# Patient Record
Sex: MALE | Race: AMERICAN INDIAN OR ALASKA NATIVE
[De-identification: names, ages, dates, MRNs, and addresses within clinical notes are randomized per-mention and may not be internally consistent; named-entity substitution may affect disease eponyms.]

---

## 2018-03-15 ENCOUNTER — HOSPITAL ENCOUNTER (OUTPATIENT)
Dept: HOSPITAL 5 - GIO | Age: 68
Discharge: HOME | End: 2018-03-15
Attending: INTERNAL MEDICINE
Payer: MEDICARE

## 2018-03-15 VITALS — SYSTOLIC BLOOD PRESSURE: 137 MMHG | DIASTOLIC BLOOD PRESSURE: 88 MMHG

## 2018-03-15 DIAGNOSIS — Z79.899: ICD-10-CM

## 2018-03-15 DIAGNOSIS — E11.9: ICD-10-CM

## 2018-03-15 DIAGNOSIS — R63.0: ICD-10-CM

## 2018-03-15 DIAGNOSIS — B96.81: ICD-10-CM

## 2018-03-15 DIAGNOSIS — J45.909: ICD-10-CM

## 2018-03-15 DIAGNOSIS — Z98.890: ICD-10-CM

## 2018-03-15 DIAGNOSIS — K44.9: Primary | ICD-10-CM

## 2018-03-15 PROCEDURE — 88342 IMHCHEM/IMCYTCHM 1ST ANTB: CPT

## 2018-03-15 PROCEDURE — 88305 TISSUE EXAM BY PATHOLOGIST: CPT

## 2018-03-15 PROCEDURE — 43239 EGD BIOPSY SINGLE/MULTIPLE: CPT

## 2018-03-15 NOTE — OPERATIVE REPORT
Operative Report


Operative Report: 


Date: 03/15/2018    


Operative Report: 





Date of procedure: 03/15/2018





Procedure: Esophagogastroduodenoscopy with multiple mucosal biopsies.





Attending physician: Tj Guy MD





: Tj Guy MD





Indication: Patient is a 68 -year-old male who presented with a history of 

recurrent epigastric pain, heartburn, dysphagia early satiety, anorexia and 

unintentional weight loss and indigestion.  An upper endoscopy is done to 

assess patient, so that treatment may be directed based on the findings.





Consent: Informed consent was obtained after advising the patient and family 

regarding nature of this procedure, its indications, potential benefits as well 

as possible complications including but not limited to bleeding perforation and 

adverse reaction to medication, infection as well as other cardiopulmonary 

complications.  An informed written and verbal consent was then obtained after 

due opportunity was provided for questions and answers.





Monitoring: Patient was monitored continuously with pulse oximetry and 

electrocardiographic recordings as well as blood pressure recordings.  Vital 

signs remained stable throughout this procedure with no untoward events.





Preoperative assessment: Patient was assessed immediately prior to this 

procedure for capacity to tolerate monitored anesthesia care and moderate 

sedation as well as general anesthesia.  Patient's ASA classification is 3,  

Mallampati class is 2, Hyomental distance is 3.





Instrument: Fly6n video endoscope





Medications: Propofol given intravenously in divided doses.  For details please 

refer to anesthesia records.





Description of procedure: Patient was placed in the left lateral decubitus 

position after achieving sedation, the endoscope was introduced into the 

esophagus under direct vision.  It was then advanced beyond the esophagus into 

the stomach and then beyond the stomach into the duodenum and to the second 

portion of the duodenum.  It was subsequently withdrawn with careful inspection 

of all mucosal surfaces with the following findings.





Findings: Patient has  an irregular Z line at 45 cm.  There was a small 

diminutive 2 cm sliding hiatal hernia seen on entry into the stomach.  There 

was erythema and erosions seen in the gastric antrum.  Biopsies of the antrum 

were obtained for histopathology.  The duodenum was normal to second portion.





Impression: Irregular Z line. 


Gastric antral erythema 


Gastric antral erosions


Hiatal hernia.





Plan: Continue treatment with proton pump inhibitors.


Follow pathology report.


Direct additional treatment based on the pathology report.


Patient will be observed clinically.  Additional recommendations will be made 

follow-up.

## 2018-03-15 NOTE — DISCHARGE SUMMARY
Short Stay Discharge Plan


Activity: advance as tolerated


Weight Bearing Status: Weight Bear as Tolerated


Diet: regular


Follow up with: 


KENYA BEARDEN MD [Other] - 7 Days

## 2018-03-16 NOTE — POST ANESTHESIA EVALUATION
- Post Anesthesia Evaluation


Patient Participated: Yes


Airway Patent: Yes


Stable Respiratory Function: Yes


Nausea/Vomiting: No


Temp > 96.8F: Yes


Pain Manageable: Yes


Adequeate Hydration: Yes


Anesthesia Complications: No


Block Receding Appropriately: No

## 2018-03-16 NOTE — ANESTHESIA CONSULTATION
Anesthesia Consult and Med Hx


Date of service: 03/15/18





- Airway


Anesthetic Teeth Evaluation: Poor


ROM Head & Neck: Adequate


Mental/Hyoid Distance: Adequate


Mallampati Class: Class II


Intubation Access Assessment: Good





- Pulmonary Exam


CTA: Yes





- Cardiac Exam


Cardiac Exam: RRR





- Pre-Operative Health Status


ASA Pre-Surgery Classification: ASA2


Proposed Anesthetic Plan: MAC





- Pulmonary


Hx Smoking: Yes





- Cardiovascular System


Hx Hypertension: Yes